# Patient Record
Sex: MALE | Race: WHITE
[De-identification: names, ages, dates, MRNs, and addresses within clinical notes are randomized per-mention and may not be internally consistent; named-entity substitution may affect disease eponyms.]

---

## 2019-03-21 ENCOUNTER — HOSPITAL ENCOUNTER (INPATIENT)
Dept: HOSPITAL 95 - ER | Age: 64
LOS: 7 days | Discharge: HOME | DRG: 189 | End: 2019-03-28
Attending: HOSPITALIST | Admitting: HOSPITALIST
Payer: MEDICARE

## 2019-03-21 VITALS — BODY MASS INDEX: 21.98 KG/M2 | WEIGHT: 145 LBS | HEIGHT: 67.99 IN

## 2019-03-21 DIAGNOSIS — I50.22: ICD-10-CM

## 2019-03-21 DIAGNOSIS — J44.1: ICD-10-CM

## 2019-03-21 DIAGNOSIS — K22.10: ICD-10-CM

## 2019-03-21 DIAGNOSIS — J96.22: Primary | ICD-10-CM

## 2019-03-21 DIAGNOSIS — I42.9: ICD-10-CM

## 2019-03-21 DIAGNOSIS — I13.0: ICD-10-CM

## 2019-03-21 DIAGNOSIS — Z99.81: ICD-10-CM

## 2019-03-21 DIAGNOSIS — N18.1: ICD-10-CM

## 2019-03-21 LAB
ALBUMIN SERPL BCP-MCNC: 3.3 G/DL (ref 3.4–5)
ALBUMIN/GLOB SERPL: 0.8 {RATIO} (ref 0.8–1.8)
ALT SERPL W P-5'-P-CCNC: 26 U/L (ref 12–78)
ANION GAP SERPL CALCULATED.4IONS-SCNC: 6 MMOL/L (ref 6–16)
AST SERPL W P-5'-P-CCNC: 27 U/L (ref 12–37)
BASOPHILS # BLD AUTO: 0.07 K/MM3 (ref 0–0.23)
BASOPHILS NFR BLD AUTO: 1 % (ref 0–2)
BILIRUB SERPL-MCNC: 0.4 MG/DL (ref 0.1–1)
BUN SERPL-MCNC: 26 MG/DL (ref 8–24)
CALCIUM SERPL-MCNC: 8.3 MG/DL (ref 8.5–10.1)
CHLORIDE SERPL-SCNC: 96 MMOL/L (ref 98–108)
CO2 SERPL-SCNC: 30 MMOL/L (ref 21–32)
CREAT SERPL-MCNC: 1.08 MG/DL (ref 0.6–1.2)
DEPRECATED RDW RBC AUTO: 48.5 FL (ref 35.1–46.3)
EOSINOPHIL # BLD AUTO: 0.34 K/MM3 (ref 0–0.68)
EOSINOPHIL NFR BLD AUTO: 3 % (ref 0–6)
ERYTHROCYTE [DISTWIDTH] IN BLOOD BY AUTOMATED COUNT: 12.9 % (ref 11.7–14.2)
GLOBULIN SER CALC-MCNC: 4.3 G/DL (ref 2.2–4)
GLUCOSE SERPL-MCNC: 122 MG/DL (ref 70–99)
HCT VFR BLD AUTO: 45.1 % (ref 37–53)
HGB BLD-MCNC: 14.9 G/DL (ref 13.5–17.5)
IMM GRANULOCYTES # BLD AUTO: 0.04 K/MM3 (ref 0–0.1)
IMM GRANULOCYTES NFR BLD AUTO: 0 % (ref 0–1)
LYMPHOCYTES # BLD AUTO: 1.26 K/MM3 (ref 0.84–5.2)
LYMPHOCYTES NFR BLD AUTO: 12 % (ref 21–46)
MCHC RBC AUTO-ENTMCNC: 33 G/DL (ref 31.5–36.5)
MCV RBC AUTO: 100 FL (ref 80–100)
MONOCYTES # BLD AUTO: 1.3 K/MM3 (ref 0.16–1.47)
MONOCYTES NFR BLD AUTO: 12 % (ref 4–13)
NEUTROPHILS # BLD AUTO: 7.93 K/MM3 (ref 1.96–9.15)
NEUTROPHILS NFR BLD AUTO: 73 % (ref 41–73)
NRBC # BLD AUTO: 0 K/MM3 (ref 0–0.02)
NRBC BLD AUTO-RTO: 0 /100 WBC (ref 0–0.2)
PH BLDA: 7.29 [PH] (ref 7.35–7.45)
PH BLDA: 7.33 [PH] (ref 7.35–7.45)
PLATELET # BLD AUTO: 235 K/MM3 (ref 150–400)
POTASSIUM SERPL-SCNC: 4.4 MMOL/L (ref 3.5–5.5)
PROT SERPL-MCNC: 7.6 G/DL (ref 6.4–8.2)
SODIUM SERPL-SCNC: 132 MMOL/L (ref 136–145)
TROPONIN I SERPL-MCNC: <0.015 NG/ML (ref 0–0.04)

## 2019-03-21 PROCEDURE — 5A09457 ASSISTANCE WITH RESPIRATORY VENTILATION, 24-96 CONSECUTIVE HOURS, CONTINUOUS POSITIVE AIRWAY PRESSURE: ICD-10-PCS | Performed by: HOSPITALIST

## 2019-03-21 NOTE — NUR
Shift Summary
 
      Asssumed care of pt at approx 1700. VSS. In no apparent sign of
distress. Pt is A&Ox4. See admission assessment for detailed assessment. Pt on
4L o2 NC upon arrival. Refuses BiPAP at this time. SR on tele. C/o some sob
with activity. Pt up to shower and tolerated this activity well. Currently
resting in bed with call light within reach. Denies any further questions,
complaints or requests at this time. Will continue to monitor until report is
given to karen MATHIS.

## 2019-03-21 NOTE — NUR
ASSUMED CARE OF PATIENT AT APPROXIMATELY 1910 FROM [facility] RN.  PATIENT
ALERT AND ORIENTED X4; INDEPENDENT IN ROOM; PATIENT HAS DOG IN ROOM; BARK
OCCASIONALLY.  PATIENT WAS IN SHOWER AT SHIFT CHANGE.  NSR ON TELE; PACER;
OXYGEN SATURATION ABOVE 90% ON 2-3LPM VIA NC; CURRENTLY ON BIPAP.  PATIENT
EXPRESSED INTEREST IN A HOME BIPAP TO RT.  PATIENT REPORTS PAIN IN LOWER BACK
FROM SPINAL STENOSIS; REPORTS TAKES MEDS AT HOME; STATES NOT PRESCRIBED PAIN
MEDS; REFUSED TYLENOL; STATES BAD FOR HIS LIVER.  PATIENT DENEIS NUMBNESS,
TINGLING, DIZZINESS AND NAUSESA.  PIV S/L.
PATIENT CURRENTLY SLEEPING IN BED; CALL LIGHT IN REACH; BED IN LOWEST
POSISTION; WILL CONTINUE TO MONITOR AND ASSESS UNTIL END OF SHIFT.

## 2019-03-22 LAB
ALBUMIN SERPL BCP-MCNC: 2.9 G/DL (ref 3.4–5)
ALBUMIN/GLOB SERPL: 0.8 {RATIO} (ref 0.8–1.8)
ALT SERPL W P-5'-P-CCNC: 22 U/L (ref 12–78)
ANION GAP SERPL CALCULATED.4IONS-SCNC: 6 MMOL/L (ref 6–16)
AST SERPL W P-5'-P-CCNC: 17 U/L (ref 12–37)
BILIRUB SERPL-MCNC: 0.3 MG/DL (ref 0.1–1)
BUN SERPL-MCNC: 37 MG/DL (ref 8–24)
CALCIUM SERPL-MCNC: 8.3 MG/DL (ref 8.5–10.1)
CHLORIDE SERPL-SCNC: 100 MMOL/L (ref 98–108)
CO2 SERPL-SCNC: 30 MMOL/L (ref 21–32)
CREAT SERPL-MCNC: 1.16 MG/DL (ref 0.6–1.2)
DEPRECATED RDW RBC AUTO: 47.1 FL (ref 35.1–46.3)
ERYTHROCYTE [DISTWIDTH] IN BLOOD BY AUTOMATED COUNT: 13 % (ref 11.7–14.2)
GLOBULIN SER CALC-MCNC: 3.8 G/DL (ref 2.2–4)
GLUCOSE SERPL-MCNC: 124 MG/DL (ref 70–99)
HCT VFR BLD AUTO: 40.6 % (ref 37–53)
HGB BLD-MCNC: 13.3 G/DL (ref 13.5–17.5)
MCHC RBC AUTO-ENTMCNC: 32.8 G/DL (ref 31.5–36.5)
MCV RBC AUTO: 99 FL (ref 80–100)
NRBC # BLD AUTO: 0 K/MM3 (ref 0–0.02)
NRBC BLD AUTO-RTO: 0 /100 WBC (ref 0–0.2)
PLATELET # BLD AUTO: 224 K/MM3 (ref 150–400)
POTASSIUM SERPL-SCNC: 4.8 MMOL/L (ref 3.5–5.5)
PROT SERPL-MCNC: 6.7 G/DL (ref 6.4–8.2)
SODIUM SERPL-SCNC: 136 MMOL/L (ref 136–145)

## 2019-03-22 NOTE — NUR
[facility]
PT RESTING IN ROOM COMFORTABLY AT THIS TIME. PER DAY SHIFT NO ACUTE CHANGES IN
STATUS. PT HAS BEEN ON 3L O2 VIA NC T/O DAY. DID NOT WEAR BIPAP TODAY. PT IS
INDEPENDENT IN ROOM. PER DAY SHIFT PT HAS CHRONIC BACK PAIN, LIDOCAINE PATCH
IN PLACE. PT REPORTS HEAT HELPS PAIN, PT ASKED IF HE WOULD LIKE A K-PAD FOR
CONTINUOUS HEAT TO AREA. PT AGREES. K-PAD WILL BE PROVIDED. RESP EVEN UNLBAORE
DON 3L NC. SATS >95%. PT HAS "EMOTIONAL SUPPORT" DOG IN ROOM. PER DAY SHIFT
DOG HAS CAUSED SOME PROBLEMS WITH BARKING AT STAFF AND NOISE SINCE ADMIT.
TODAY DOG HAS SETTLED IN WELL AND HAS NOT BARKED AT STAFF SINCE START OF DAY
SHIFT. DOG IS SITTING IN BED W/ PT AT THIS TIME QUIET AND PLEASENT. DOG ALLOWS
STAFF TO PET W/O INCIDENT. NURSING SUPERVISOR AND SECURITY AWARE OF SITUATION
WITH DOG AND PREVIOUS NOISE COMPLAINTS. PT IS [facility] DOG MUST BE CONTROLLED OR
MUST BE REMOVED FROM HOSPITAL. CALL LIGHT IS IN REACH. WILL CONT TO MONITOR.

## 2019-03-22 NOTE — NUR
TRANSFER OF CARE
REPORT CALLED TO MEDICAL FLOOR RN. PT ADVISED OF MOVE TO NEW ROOM. BELONGINSG
ALL PACKAGED UP AND SENT WITH PT. KPAD SET UP SENT W. PT. DOG RIDING IN BED W/
PT PLEASENT AFFECT. PT ADVISED ABOUT NEEDING TO ORIENT DOG TO NEW UNIT AND
STAFF MEMBERS AND REMAIN IN COMPLIENCE WITH KEEPING DOG UNDER CONTROL AND
PLEASET W/ STAFF. NO BELONGINGS LEFT IN ROOM. PT LEFT VIA BED W/ CNA

## 2019-03-22 NOTE — NUR
Spiritual care visit conducted. Patient was lying in bed and alert when I
entered patient's room. Therapeutic alliance was easily established so patint
openly shared about his past drug abuuse and dealing and his [facility] time.
Patient also shared about his dysfunctional relationships and long history of
medical issues. I listened empathically, explored patient's belief system,
provided companionship and prayer. patient responded well to all interventions
and showed signs of restored gloria. Patient expressed appreciation for the
visit.

## 2019-03-22 NOTE — NUR
Assumed Care:
 
      Assumed care of pt at approx 0700. VSS. In no apparent sign of distress.
Pt is A&Ox4. Calls appropriately. Repositions self. C/o pain in back and abd.
Pt was wearing BiPAP upon entering room, but removed to put on 3L O2 NC to
eat. See shift assessment for detailed assessment. BiPAP settings at 12/6, 12
and 30% FiO2. C/o dyspnea with exertion. Pt currently resting in bed with call
light within reach. Denies any further questions, complaints or requests at
this time. Will continue to monitor.

## 2019-03-22 NOTE — NUR
Shift Summary
 
      No acute changes since initial shift assessment. VSS. In no apparent
sign of distress. Pt has remained A&Ox4. Calls appropriately. Independent in
the room. Denies any acute complaints or requests t/o the shift.  Pt has
remained on 3L O2 NC and has not worn the BiPAP at all. Pt ambulated in halls
today on 4L O2 NC and tolerated well. Pt wondering about being set up for a
CPAP or BIPAP at home. Pt currently resting in bed with call light within
reach. Denies any further questions, complaints or requests at this time. Pt
medical status w/out tele but no bed assignment at this time. Will continue to
monitor until report is given to karen MATHIS.

## 2019-03-23 LAB
DEPRECATED RDW RBC AUTO: 50.4 FL (ref 35.1–46.3)
ERYTHROCYTE [DISTWIDTH] IN BLOOD BY AUTOMATED COUNT: 13.1 % (ref 11.7–14.2)
HCT VFR BLD AUTO: 40.8 % (ref 37–53)
HGB BLD-MCNC: 13.2 G/DL (ref 13.5–17.5)
MCHC RBC AUTO-ENTMCNC: 32.4 G/DL (ref 31.5–36.5)
MCV RBC AUTO: 103 FL (ref 80–100)
NRBC # BLD AUTO: 0 K/MM3 (ref 0–0.02)
NRBC BLD AUTO-RTO: 0 /100 WBC (ref 0–0.2)
PLATELET # BLD AUTO: 249 K/MM3 (ref 150–400)

## 2019-03-23 NOTE — NUR
walking rounds completed with day staff introduced nurse to dog and explained
issues, call light in reach, saline locked, 3 L via nc but not using when
doing rounds and stats were 93%

## 2019-03-23 NOTE — NUR
SHIFT SUMMARY
PATIENT ON 3L NC, BIPAP AT NIGHT, CONT PULSE OX IN PLACE. UP AL IN ROOM, USES
W/C TO LEAVE ROOM BUT USES INDEPENDANTLY. CAUTION OF DOG UNATTENDED AT
BEDSIDE. FULLY A&O, LIDOCAINE PATCH CAME OFF AT 1700 IN SHOWER.

## 2019-03-23 NOTE — NUR
PT TRANSFER TO ROOM 312
GAVE HANDOFF TO NURSE RANGEL. REPORTED ON PT'S DIAGNOSES, MEDICATIONS, PLAN
FOR DISCHARGE, ORIENTATION, CURRENT STATUS. NURSE RANGEL HAD NO FURTHER
QUESTIONS. PT'S PERSONAL POSSESSIONS AND PET WERE TRANSPORTED WITH PT VIA
WHEELCHAIR AND CART, WITH ASSISTANCE FROM NURSE RANGEL AND NURSING ASSISTANT.
PT WAS TRANSFERED TO ROOM 312 WNL. NURSE RANGEL HAD NO FURTHER QUESTIONS.

## 2019-03-24 LAB
BASOPHILS # BLD AUTO: 0.04 K/MM3 (ref 0–0.23)
BASOPHILS NFR BLD AUTO: 0 % (ref 0–2)
DEPRECATED RDW RBC AUTO: 50.9 FL (ref 35.1–46.3)
EOSINOPHIL # BLD AUTO: 0 K/MM3 (ref 0–0.68)
EOSINOPHIL NFR BLD AUTO: 0 % (ref 0–6)
ERYTHROCYTE [DISTWIDTH] IN BLOOD BY AUTOMATED COUNT: 13.2 % (ref 11.7–14.2)
HCT VFR BLD AUTO: 45 % (ref 37–53)
HGB BLD-MCNC: 14.3 G/DL (ref 13.5–17.5)
IMM GRANULOCYTES # BLD AUTO: 0.39 K/MM3 (ref 0–0.1)
IMM GRANULOCYTES NFR BLD AUTO: 2 % (ref 0–1)
LYMPHOCYTES # BLD AUTO: 0.34 K/MM3 (ref 0.84–5.2)
LYMPHOCYTES NFR BLD AUTO: 2 % (ref 21–46)
MCHC RBC AUTO-ENTMCNC: 31.8 G/DL (ref 31.5–36.5)
MCV RBC AUTO: 104 FL (ref 80–100)
MONOCYTES # BLD AUTO: 0.63 K/MM3 (ref 0.16–1.47)
MONOCYTES NFR BLD AUTO: 3 % (ref 4–13)
NEUTROPHILS # BLD AUTO: 21.95 K/MM3 (ref 1.96–9.15)
NEUTROPHILS NFR BLD AUTO: 94 % (ref 41–73)
NRBC # BLD AUTO: 0 K/MM3 (ref 0–0.02)
NRBC BLD AUTO-RTO: 0 /100 WBC (ref 0–0.2)
PLATELET # BLD AUTO: 256 K/MM3 (ref 150–400)

## 2019-03-24 NOTE — NUR
Patient requested a spiritual care visit. Patient shared with me about some
deep concerns he has about the people he cares about, about his living
situation and his connection to God. I listened empathically, provided
pastoral , companionship and prayer. Patient responded well and was
teary eyed during the prayer. Patient expressed gratitude for the visit.

## 2019-03-24 NOTE — NUR
SHIFT SUMMARY:
NO ACUTE CHANGES TO REPORT THIS SHIFT. PT A&O; IRRITABLE; COOPERATIVE WITH
CARE; SPEECH GARBLED. SUPPORT DOG IN ROOM WITH PATIENT. O2 @ 3L VIA NC. IV ABX
& IV STEROIDS CONTINUING. REPORT GIVEN TO ONCOMING RN.

## 2019-03-25 LAB
ALBUMIN SERPL BCP-MCNC: 2.6 G/DL (ref 3.4–5)
ALBUMIN/GLOB SERPL: 0.8 {RATIO} (ref 0.8–1.8)
ALT SERPL W P-5'-P-CCNC: 37 U/L (ref 12–78)
ANION GAP SERPL CALCULATED.4IONS-SCNC: 0 MMOL/L (ref 6–16)
AST SERPL W P-5'-P-CCNC: 24 U/L (ref 12–37)
BASOPHILS # BLD AUTO: 0.02 K/MM3 (ref 0–0.23)
BASOPHILS NFR BLD AUTO: 0 % (ref 0–2)
BILIRUB SERPL-MCNC: 0.3 MG/DL (ref 0.1–1)
BUN SERPL-MCNC: 38 MG/DL (ref 8–24)
CALCIUM SERPL-MCNC: 8 MG/DL (ref 8.5–10.1)
CHLORIDE SERPL-SCNC: 100 MMOL/L (ref 98–108)
CO2 SERPL-SCNC: 39 MMOL/L (ref 21–32)
CREAT SERPL-MCNC: 1.21 MG/DL (ref 0.6–1.2)
DEPRECATED RDW RBC AUTO: 51.3 FL (ref 35.1–46.3)
EOSINOPHIL # BLD AUTO: 0 K/MM3 (ref 0–0.68)
EOSINOPHIL NFR BLD AUTO: 0 % (ref 0–6)
ERYTHROCYTE [DISTWIDTH] IN BLOOD BY AUTOMATED COUNT: 13.2 % (ref 11.7–14.2)
GLOBULIN SER CALC-MCNC: 3.4 G/DL (ref 2.2–4)
GLUCOSE SERPL-MCNC: 112 MG/DL (ref 70–99)
HCT VFR BLD AUTO: 43.4 % (ref 37–53)
HGB BLD-MCNC: 13.5 G/DL (ref 13.5–17.5)
IMM GRANULOCYTES # BLD AUTO: 0.33 K/MM3 (ref 0–0.1)
IMM GRANULOCYTES NFR BLD AUTO: 2 % (ref 0–1)
LYMPHOCYTES # BLD AUTO: 0.34 K/MM3 (ref 0.84–5.2)
LYMPHOCYTES NFR BLD AUTO: 2 % (ref 21–46)
MCHC RBC AUTO-ENTMCNC: 31.1 G/DL (ref 31.5–36.5)
MCV RBC AUTO: 105 FL (ref 80–100)
MONOCYTES # BLD AUTO: 0.61 K/MM3 (ref 0.16–1.47)
MONOCYTES NFR BLD AUTO: 4 % (ref 4–13)
NEUTROPHILS # BLD AUTO: 16.08 K/MM3 (ref 1.96–9.15)
NEUTROPHILS NFR BLD AUTO: 93 % (ref 41–73)
NRBC # BLD AUTO: 0 K/MM3 (ref 0–0.02)
NRBC BLD AUTO-RTO: 0 /100 WBC (ref 0–0.2)
PLATELET # BLD AUTO: 230 K/MM3 (ref 150–400)
POTASSIUM SERPL-SCNC: 4.8 MMOL/L (ref 3.5–5.5)
PROT SERPL-MCNC: 6 G/DL (ref 6.4–8.2)
SODIUM SERPL-SCNC: 139 MMOL/L (ref 136–145)

## 2019-03-25 NOTE — NUR
HE HAS GONE OUTSIDE ONCE WITH HIS THERAPY DOG TODAY. HE HAS DONE BUSINESS ON
THE PHONE, ONE CONVERSATION HEARD FROM BEHIND A CLOSED DOOR THIS MORNING. HE
SOUNDED UPSET. O2 3L. HE USES ACCESSORY MUSCLES TO BREATHE. SKIN DRY WITH
BRUISES AND SCABS. HE IS STILL ON IV STEROID. HE WEARS HIS CONTINUOUS BIOX
WHEN HE FEELS LIKE IT. NO CHANGES.

## 2019-03-26 LAB
ANION GAP SERPL CALCULATED.4IONS-SCNC: 2 MMOL/L (ref 6–16)
BASOPHILS # BLD AUTO: 0.03 K/MM3 (ref 0–0.23)
BASOPHILS NFR BLD AUTO: 0 % (ref 0–2)
BUN SERPL-MCNC: 39 MG/DL (ref 8–24)
CALCIUM SERPL-MCNC: 7.9 MG/DL (ref 8.5–10.1)
CHLORIDE SERPL-SCNC: 99 MMOL/L (ref 98–108)
CO2 SERPL-SCNC: 36 MMOL/L (ref 21–32)
CREAT SERPL-MCNC: 1.03 MG/DL (ref 0.6–1.2)
DEPRECATED RDW RBC AUTO: 49.9 FL (ref 35.1–46.3)
EOSINOPHIL # BLD AUTO: 0 K/MM3 (ref 0–0.68)
EOSINOPHIL NFR BLD AUTO: 0 % (ref 0–6)
ERYTHROCYTE [DISTWIDTH] IN BLOOD BY AUTOMATED COUNT: 13 % (ref 11.7–14.2)
GLUCOSE SERPL-MCNC: 156 MG/DL (ref 70–99)
HCT VFR BLD AUTO: 45.6 % (ref 37–53)
HGB BLD-MCNC: 14.2 G/DL (ref 13.5–17.5)
IMM GRANULOCYTES # BLD AUTO: 0.48 K/MM3 (ref 0–0.1)
IMM GRANULOCYTES NFR BLD AUTO: 3 % (ref 0–1)
LYMPHOCYTES # BLD AUTO: 0.3 K/MM3 (ref 0.84–5.2)
LYMPHOCYTES NFR BLD AUTO: 2 % (ref 21–46)
MCHC RBC AUTO-ENTMCNC: 31.1 G/DL (ref 31.5–36.5)
MCV RBC AUTO: 103 FL (ref 80–100)
MONOCYTES # BLD AUTO: 1.19 K/MM3 (ref 0.16–1.47)
MONOCYTES NFR BLD AUTO: 7 % (ref 4–13)
NEUTROPHILS # BLD AUTO: 15.74 K/MM3 (ref 1.96–9.15)
NEUTROPHILS NFR BLD AUTO: 89 % (ref 41–73)
NRBC # BLD AUTO: 0 K/MM3 (ref 0–0.02)
NRBC BLD AUTO-RTO: 0 /100 WBC (ref 0–0.2)
PLATELET # BLD AUTO: 248 K/MM3 (ref 150–400)
POTASSIUM SERPL-SCNC: 4.5 MMOL/L (ref 3.5–5.5)
SODIUM SERPL-SCNC: 137 MMOL/L (ref 136–145)

## 2019-03-26 NOTE — NUR
NIGHT SHIFT SUMMARY
NO ACUTE CHANGES THIS SHIFT. PT AAOX4 AND INDEPENDENT IN ROOM USING
WHEELCHAIR. PT GOES OUTSIDE WITH HIS SERVICE DOG. O2 SATS STABLE OVER 90%
WHILE ON HOME DOSE OF O2 AND CPAP WHILE SLEEPING. PT DENIES PAIN, N/V. VSS,
WILL CONTINUE TO MONITOR.

## 2019-03-26 NOTE — NUR
Spiritual care visit provided. Patient had a visitor, Frank, that patient
introduced me to so I cut my visit short so they had time to talk. I simply
prayed for patient and left. Patient thanked me for the visit.

## 2019-03-26 NOTE — NUR
HE HAS HAD A VISITOR X2 DROP THINGS OFF FOR HIM. HE HAS TAKEN HIS DOG OUTSIDE
X1 TODAY. HE THOUGHT HE WANTED TO SHOWER BUT CHANGED HIS MIND. NO CHANGES IN
HIS MEDICAL CONDITION.  JUST ROUNDED.

## 2019-03-27 LAB
ANION GAP SERPL CALCULATED.4IONS-SCNC: 3 MMOL/L (ref 6–16)
BASOPHILS # BLD AUTO: 0.06 K/MM3 (ref 0–0.23)
BASOPHILS NFR BLD AUTO: 0 % (ref 0–2)
BUN SERPL-MCNC: 37 MG/DL (ref 8–24)
CALCIUM SERPL-MCNC: 7.9 MG/DL (ref 8.5–10.1)
CHLORIDE SERPL-SCNC: 100 MMOL/L (ref 98–108)
CO2 SERPL-SCNC: 38 MMOL/L (ref 21–32)
CREAT SERPL-MCNC: 1.2 MG/DL (ref 0.6–1.2)
DEPRECATED RDW RBC AUTO: 51.6 FL (ref 35.1–46.3)
EOSINOPHIL # BLD AUTO: 0 K/MM3 (ref 0–0.68)
EOSINOPHIL NFR BLD AUTO: 0 % (ref 0–6)
ERYTHROCYTE [DISTWIDTH] IN BLOOD BY AUTOMATED COUNT: 13.1 % (ref 11.7–14.2)
GLUCOSE SERPL-MCNC: 115 MG/DL (ref 70–99)
HCT VFR BLD AUTO: 44.1 % (ref 37–53)
HGB BLD-MCNC: 13.9 G/DL (ref 13.5–17.5)
IMM GRANULOCYTES # BLD AUTO: 0.51 K/MM3 (ref 0–0.1)
IMM GRANULOCYTES NFR BLD AUTO: 3 % (ref 0–1)
LYMPHOCYTES # BLD AUTO: 0.54 K/MM3 (ref 0.84–5.2)
LYMPHOCYTES NFR BLD AUTO: 3 % (ref 21–46)
MCHC RBC AUTO-ENTMCNC: 31.5 G/DL (ref 31.5–36.5)
MCV RBC AUTO: 106 FL (ref 80–100)
MONOCYTES # BLD AUTO: 1.73 K/MM3 (ref 0.16–1.47)
MONOCYTES NFR BLD AUTO: 9 % (ref 4–13)
NEUTROPHILS # BLD AUTO: 16.59 K/MM3 (ref 1.96–9.15)
NEUTROPHILS NFR BLD AUTO: 85 % (ref 41–73)
NRBC # BLD AUTO: 0.02 K/MM3 (ref 0–0.02)
NRBC BLD AUTO-RTO: 0.1 /100 WBC (ref 0–0.2)
PLATELET # BLD AUTO: 249 K/MM3 (ref 150–400)
POTASSIUM SERPL-SCNC: 4.8 MMOL/L (ref 3.5–5.5)
SODIUM SERPL-SCNC: 141 MMOL/L (ref 136–145)

## 2019-03-27 NOTE — NUR
SHIFT SUMMARY-
PT C/O BACK PAIN. MEDS GIVEN PER EMAR. PT REPORTS MILD SOB. DYSPNEA UPON
EXERTION. 93% ON 3L O2 NC. PT INDEPENDENT IN ROOM WITH WHEELCHAIR. NO OTHER
SIGNIFICANT CHANGES THIS SHIFT.

## 2019-03-27 NOTE — NUR
SHIFT SUMMARY
 
PT HAS DENIED NEEDS T/O THE NIGHT. INDEPENDENT IN THE ROOM. PT DOG AT BEDSIDE
T/O SHIFT. SATS WNL ON 3L O2. LIDOCAINE PATCH FOR CHRONIC LOW BACK PAIN.
RESTFUL NIGHT. ASSESSMENT UNCHANGED. WILL CONTINUE TO MONITOR AND REPORT TO
ONCOMING RN.

## 2019-03-28 LAB
ANION GAP SERPL CALCULATED.4IONS-SCNC: 2 MMOL/L (ref 6–16)
BASOPHILS # BLD AUTO: 0.07 K/MM3 (ref 0–0.23)
BASOPHILS NFR BLD AUTO: 0 % (ref 0–2)
BUN SERPL-MCNC: 38 MG/DL (ref 8–24)
CALCIUM SERPL-MCNC: 7.9 MG/DL (ref 8.5–10.1)
CHLORIDE SERPL-SCNC: 96 MMOL/L (ref 98–108)
CO2 SERPL-SCNC: 40 MMOL/L (ref 21–32)
CREAT SERPL-MCNC: 1.08 MG/DL (ref 0.6–1.2)
DEPRECATED RDW RBC AUTO: 50.8 FL (ref 35.1–46.3)
EOSINOPHIL # BLD AUTO: 0.01 K/MM3 (ref 0–0.68)
EOSINOPHIL NFR BLD AUTO: 0 % (ref 0–6)
ERYTHROCYTE [DISTWIDTH] IN BLOOD BY AUTOMATED COUNT: 13.2 % (ref 11.7–14.2)
GLUCOSE SERPL-MCNC: 123 MG/DL (ref 70–99)
HCT VFR BLD AUTO: 44.4 % (ref 37–53)
HGB BLD-MCNC: 13.7 G/DL (ref 13.5–17.5)
IMM GRANULOCYTES # BLD AUTO: 0.87 K/MM3 (ref 0–0.1)
IMM GRANULOCYTES NFR BLD AUTO: 5 % (ref 0–1)
LYMPHOCYTES # BLD AUTO: 1.16 K/MM3 (ref 0.84–5.2)
LYMPHOCYTES NFR BLD AUTO: 6 % (ref 21–46)
MCHC RBC AUTO-ENTMCNC: 30.9 G/DL (ref 31.5–36.5)
MCV RBC AUTO: 105 FL (ref 80–100)
MONOCYTES # BLD AUTO: 1.52 K/MM3 (ref 0.16–1.47)
MONOCYTES NFR BLD AUTO: 8 % (ref 4–13)
NEUTROPHILS # BLD AUTO: 14.74 K/MM3 (ref 1.96–9.15)
NEUTROPHILS NFR BLD AUTO: 80 % (ref 41–73)
NRBC # BLD AUTO: 0 K/MM3 (ref 0–0.02)
NRBC BLD AUTO-RTO: 0 /100 WBC (ref 0–0.2)
PLATELET # BLD AUTO: 232 K/MM3 (ref 150–400)
POTASSIUM SERPL-SCNC: 4.8 MMOL/L (ref 3.5–5.5)
SODIUM SERPL-SCNC: 138 MMOL/L (ref 136–145)

## 2019-03-28 NOTE — NUR
SHIFT SUMMARY-
D/C COMPLETE. MEDS FAXED TO [facility] IN [place]. DELFINA ALVAREZ GOING OVER D/C INSTRUCTIONS WITH PT. PT REPORTS HE IS
WAITING FOR HIS RIDE. NO OTHER SIGNIFICANT CHANGES THIS SHIFT.

## 2019-03-28 NOTE — NUR
SHIFT SUMMARY
PT HAS SLEPT OFF AND ON T/O SHIFT. PT HAS HAD NO ACUTE ISSUES NOTED. PT IS
CURRENTLY WATCHING TV AND BREATHING EASY. CALL LIGHT IN REACH.